# Patient Record
Sex: FEMALE | Race: WHITE | Employment: FULL TIME | URBAN - METROPOLITAN AREA
[De-identification: names, ages, dates, MRNs, and addresses within clinical notes are randomized per-mention and may not be internally consistent; named-entity substitution may affect disease eponyms.]

---

## 2022-03-18 PROBLEM — J45.909 REACTIVE AIRWAY DISEASE WITHOUT COMPLICATION: Status: ACTIVE | Noted: 2018-12-24

## 2022-03-18 PROBLEM — F98.8 ATTENTION DEFICIT DISORDER (ADD) IN ADULT: Status: ACTIVE | Noted: 2018-12-24

## 2022-03-19 PROBLEM — E66.01 MORBID OBESITY (HCC): Status: ACTIVE | Noted: 2018-12-24

## 2022-03-19 PROBLEM — J30.89 NON-SEASONAL ALLERGIC RHINITIS: Status: ACTIVE | Noted: 2018-12-24

## 2022-03-20 PROBLEM — E06.3 HASHIMOTO'S THYROIDITIS: Status: ACTIVE | Noted: 2019-01-28

## 2023-05-26 ENCOUNTER — HOSPITAL ENCOUNTER (EMERGENCY)
Age: 28
Discharge: HOME | End: 2023-05-26
Attending: EMERGENCY MEDICINE
Payer: COMMERCIAL

## 2023-05-26 VITALS
DIASTOLIC BLOOD PRESSURE: 75 MMHG | HEIGHT: 66 IN | RESPIRATION RATE: 16 BRPM | HEART RATE: 62 BPM | WEIGHT: 210 LBS | SYSTOLIC BLOOD PRESSURE: 119 MMHG | OXYGEN SATURATION: 99 % | TEMPERATURE: 98.6 F | BODY MASS INDEX: 33.75 KG/M2

## 2023-05-26 DIAGNOSIS — Z23 NEED FOR TETANUS BOOSTER: ICD-10-CM

## 2023-05-26 DIAGNOSIS — T14.8XXA PUNCTURE WOUND: Primary | ICD-10-CM

## 2023-05-26 PROCEDURE — 99284 EMERGENCY DEPT VISIT MOD MDM: CPT

## 2023-05-26 PROCEDURE — 6360000002 HC RX W HCPCS

## 2023-05-26 PROCEDURE — 90714 TD VACC NO PRESV 7 YRS+ IM: CPT

## 2023-05-26 PROCEDURE — 90471 IMMUNIZATION ADMIN: CPT

## 2023-05-26 RX ORDER — CEPHALEXIN 500 MG/1
500 CAPSULE ORAL 2 TIMES DAILY
Qty: 14 CAPSULE | Refills: 0 | Status: SHIPPED | OUTPATIENT
Start: 2023-05-26 | End: 2023-06-02

## 2023-05-26 RX ORDER — TETANUS AND DIPHTHERIA TOXOIDS ADSORBED 2; 2 [LF]/.5ML; [LF]/.5ML
0.5 INJECTION INTRAMUSCULAR
Status: COMPLETED | OUTPATIENT
Start: 2023-05-26 | End: 2023-05-26

## 2023-05-26 RX ADMIN — TETANUS AND DIPHTHERIA TOXOIDS ADSORBED 0.5 ML: 2; 2 INJECTION INTRAMUSCULAR at 13:39

## 2023-05-26 ASSESSMENT — ENCOUNTER SYMPTOMS
PHOTOPHOBIA: 0
VOMITING: 0
SHORTNESS OF BREATH: 0
COUGH: 0
NAUSEA: 0
ABDOMINAL PAIN: 0

## 2023-05-26 ASSESSMENT — PAIN - FUNCTIONAL ASSESSMENT: PAIN_FUNCTIONAL_ASSESSMENT: 0-10

## 2023-05-26 ASSESSMENT — PAIN DESCRIPTION - DESCRIPTORS: DESCRIPTORS: THROBBING

## 2023-05-26 ASSESSMENT — PAIN DESCRIPTION - LOCATION: LOCATION: HAND

## 2023-05-26 ASSESSMENT — PAIN DESCRIPTION - ORIENTATION: ORIENTATION: LEFT

## 2023-05-26 ASSESSMENT — PAIN SCALES - GENERAL: PAINLEVEL_OUTOF10: 1

## 2023-05-26 NOTE — DISCHARGE INSTRUCTIONS
Take all the antibiotics as prescribed even if you are feeling better. Be sure to keep the area clean and dry. You may use bandages to help cover and protect the wound. If you see any signs of infection such as redness, swelling, worsening pain, fever, or chills, return immediately for further treatment evaluation.

## 2023-05-26 NOTE — ED NOTES
I have reviewed discharge instructions with the patient. The patient verbalized understanding. Patient left ED via Discharge Method: ambulatory to Home with (family, self). Opportunity for questions and clarification provided. Patient given 1 scripts. To continue your aftercare when you leave the hospital, you may receive an automated call from our care team to check in on how you are doing. This is a free service and part of our promise to provide the best care and service to meet your aftercare needs.  If you have questions, or wish to unsubscribe from this service please call [phone]. Thank you for Choosing our [facility] Emergency Department.        Iain Gómez RN  05/26/23 [id number]

## 2023-05-26 NOTE — ED NOTES
Small puncture wound to the posterior left hand.   Bleeding controlled     Femi Romeo RN  05/26/23 [id number]

## 2023-05-26 NOTE — ED PROVIDER NOTES
Emergency Department Provider Note       PCP: EBONY Brown CNP   Age: 24 y.o. Sex: female     DISPOSITION Decision To Discharge 05/26/2023 01:57:00 PM       ICD-10-CM    1. Puncture wound  T14. 8XXA       2. Need for tetanus booster  Z23           Medical Decision Making     Complexity of Problems Addressed:  1 or more acute illnesses that pose a threat to life or bodily function. Data Reviewed and Analyzed:  Category 1:   I independently ordered and reviewed each unique test.     Category 3: Discussion of management or test interpretation. This patient is an otherwise healthy 29-year-old female who presents today after sustaining a puncture wound to the webspace between her left first and second digits. Patient presents in no acute distress. Vitals are stable within normal limits. She has a very small puncture wound to her left hand between the webspace of her first and second digits. I did consider x-ray imaging of this hand, however patient declined as she states that it was not deep enough to injure bone and she did not think that there was any foreign body involvement. X-ray imaging was not ordered. Patient does not know when her last tetanus shot was so I did update that today. As this is a small puncture wound, I will not close this so as to avoid infection. Patient is traveling out of state tomorrow and will not be around to see her primary care doctor for close follow-up so I will start her on p.o. antibiotics for prophylaxis of infection. I did clean her wound with skin  and placed a bandage over her wound. All questions were answered and return precautions were given. Patient verbalized understanding and appreciation for her care today. Risk of Complications and/or Morbidity of Patient Management:  Prescription drug management performed. Shared medical decision making was utilized in creating the patients health plan today. Considerations:  The

## 2023-05-26 NOTE — ED TRIAGE NOTES
Pt to Ed c/o small punctured wound to back of left hand caused by a . Bleeding controled with a band aid. Tetanus status unknown.

## 2024-05-16 ENCOUNTER — HOSPITAL ENCOUNTER (EMERGENCY)
Age: 29
Discharge: HOME | End: 2024-05-16
Attending: EMERGENCY MEDICINE
Payer: COMMERCIAL

## 2024-05-16 VITALS
BODY MASS INDEX: 37.13 KG/M2 | TEMPERATURE: 98.4 F | HEART RATE: 93 BPM | RESPIRATION RATE: 19 BRPM | OXYGEN SATURATION: 98 % | SYSTOLIC BLOOD PRESSURE: 132 MMHG | WEIGHT: 245 LBS | HEIGHT: 68 IN | DIASTOLIC BLOOD PRESSURE: 90 MMHG

## 2024-05-16 DIAGNOSIS — Z20.2 EXPOSURE TO SYPHILIS: Primary | ICD-10-CM

## 2024-05-16 LAB — T PALLIDUM AB SER QL IA: NONREACTIVE

## 2024-05-16 PROCEDURE — 86780 TREPONEMA PALLIDUM: CPT

## 2024-05-16 PROCEDURE — 99283 EMERGENCY DEPT VISIT LOW MDM: CPT

## 2024-05-16 PROCEDURE — 6370000000 HC RX 637 (ALT 250 FOR IP): Performed by: EMERGENCY MEDICINE

## 2024-05-16 RX ORDER — DOXYCYCLINE HYCLATE 100 MG
100 TABLET ORAL 2 TIMES DAILY
Qty: 28 TABLET | Refills: 0 | Status: SHIPPED | OUTPATIENT
Start: 2024-05-16 | End: 2024-05-30

## 2024-05-16 RX ORDER — DOXYCYCLINE HYCLATE 100 MG/1
100 CAPSULE ORAL
Status: COMPLETED | OUTPATIENT
Start: 2024-05-16 | End: 2024-05-16

## 2024-05-16 RX ADMIN — DOXYCYCLINE HYCLATE 100 MG: 100 CAPSULE ORAL at 17:45

## 2024-05-16 ASSESSMENT — PAIN - FUNCTIONAL ASSESSMENT: PAIN_FUNCTIONAL_ASSESSMENT: NONE - DENIES PAIN

## 2024-05-16 NOTE — ED PROVIDER NOTES
Emergency Department Provider Note       PCP: Bud Post MD   Age: 28 y.o.   Sex: female     DISPOSITION Decision To Discharge 05/16/2024 05:32:23 PM       ICD-10-CM    1. Exposure to syphilis  Z20.2           Medical Decision Making     28-year-old female patient here with concerns over a positive syphilis test  We will go ahead and repeat the blood work today to confirm  Start the patient on doxycycline as the current preferred treatment option       1 acute, uncomplicated illness or injury.  Prescription drug management performed.  Shared medical decision making was utilized in creating the patients health plan today.    I independently ordered and reviewed each unique test.  I reviewed external records: provider visit note from PCP.  I reviewed external records: provider visit note from outside specialist.   The patients assessment required an independent historian: Parent at bedside.  The reason they were needed is important historical information not provided by the patient.                History     28-year-old female patient presents with concerns over positive syphilis test  Patient states she donates plasma regularly and at her last set of screening lab work she was called by [facility] and told that she tested positive for syphilis  Patient states that she has not had any recent sexual contact, really in almost a year.  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    The history is provided by the patient and a parent.   Sexually Transmitted Diseases   This is a new problem. The current episode started 3 to 5 hours ago. The problem has not changed since onset.There was no discharge from the vagina. Pertinent negatives include no fever, no abdominal discomfort, no genital burning, no genital itching, no

## 2024-05-16 NOTE — ED TRIAGE NOTES
Pt ambulatory to triage with steady gait, mother by side. PT reports donating plasma in [place]. Donation center did a 4 month blood test and [facility] called her today and told her the blood work tested positive for syphilis. PT denies any symptoms. Reports not being sexually active in over a year. Pt states she was tested for blood work to donate plasma in January, and her blood work was normal.

## 2024-05-16 NOTE — DISCHARGE INSTRUCTIONS
Complete all antibiotics  Drink plenty of fluids  Any sexual partners should be tested to determine if any treatment    Your repeat testing today will be available in [facility] in 3 to 5 days.  An ER representative will call you if we receive a positive test result    Call your doctor or the follow up doctor to set up appointment for recheck visit    Return to ER for any worsening symptoms or new problems which may arise